# Patient Record
Sex: MALE | Race: WHITE | Employment: FULL TIME | URBAN - NONMETROPOLITAN AREA
[De-identification: names, ages, dates, MRNs, and addresses within clinical notes are randomized per-mention and may not be internally consistent; named-entity substitution may affect disease eponyms.]

---

## 2019-09-17 ENCOUNTER — OFFICE VISIT (OUTPATIENT)
Dept: FAMILY MEDICINE | Facility: CLINIC | Age: 39
End: 2019-09-17
Payer: COMMERCIAL

## 2019-09-17 VITALS
SYSTOLIC BLOOD PRESSURE: 138 MMHG | WEIGHT: 195 LBS | BODY MASS INDEX: 31.34 KG/M2 | OXYGEN SATURATION: 100 % | TEMPERATURE: 98.5 F | HEART RATE: 99 BPM | HEIGHT: 66 IN | DIASTOLIC BLOOD PRESSURE: 90 MMHG | RESPIRATION RATE: 12 BRPM

## 2019-09-17 DIAGNOSIS — J02.9 SORE THROAT: ICD-10-CM

## 2019-09-17 DIAGNOSIS — J03.90 ACUTE TONSILLITIS, UNSPECIFIED ETIOLOGY: Primary | ICD-10-CM

## 2019-09-17 LAB
DEPRECATED S PYO AG THROAT QL EIA: NORMAL
HETEROPH AB SER QL: NEGATIVE
SPECIMEN SOURCE: NORMAL

## 2019-09-17 PROCEDURE — 87081 CULTURE SCREEN ONLY: CPT | Performed by: FAMILY MEDICINE

## 2019-09-17 PROCEDURE — 36415 COLL VENOUS BLD VENIPUNCTURE: CPT | Performed by: FAMILY MEDICINE

## 2019-09-17 PROCEDURE — 99203 OFFICE O/P NEW LOW 30 MIN: CPT | Performed by: FAMILY MEDICINE

## 2019-09-17 PROCEDURE — 87880 STREP A ASSAY W/OPTIC: CPT | Performed by: FAMILY MEDICINE

## 2019-09-17 PROCEDURE — 86308 HETEROPHILE ANTIBODY SCREEN: CPT | Performed by: FAMILY MEDICINE

## 2019-09-17 RX ORDER — AZITHROMYCIN 250 MG/1
TABLET, FILM COATED ORAL
Qty: 6 TABLET | Refills: 0 | Status: SHIPPED | OUTPATIENT
Start: 2019-09-17 | End: 2021-09-24

## 2019-09-17 SDOH — HEALTH STABILITY: MENTAL HEALTH: HOW OFTEN DO YOU HAVE A DRINK CONTAINING ALCOHOL?: NEVER

## 2019-09-17 ASSESSMENT — MIFFLIN-ST. JEOR: SCORE: 1752.26

## 2019-09-17 NOTE — PROGRESS NOTES
aziSUBJECTIVE   Ac Mercado is a 39 year old male who presents with     Acute Illness   Acute illness concerns: headache, sore throat ear pain  Onset: 2 weeks    Fever: no     Chills/Sweats: no     Headache (location?): YES    Sinus Pressure:no    Conjunctivitis:  no    Ear Pain: YES- right     Rhinorrhea: no     Congestion: no     Sore Throat: YES     Cough: no    Wheeze: no     Decreased Appetite: no     Nausea: no     Vomiting: no     Diarrhea:  no     Dysuria/Freq.: no     Fatigue/Achiness: YES    Sick/Strep Exposure: YES- son was sick a week aggo     Therapies Tried and outcome: theraflu, cough medication isnt helping         PCP   Chet Carson [phone]    Health Maintenance        Health Maintenance Due   Topic Date Due     PREVENTIVE CARE VISIT  1982     HIV SCREENING  1997     DTAP/TDAP/TD IMMUNIZATION (1 - Tdap) 2007     LIPID  2017     PHQ-2  2019     INFLUENZA VACCINE (1) 2019       HPI      There is no problem list on file for this patient.    Current Outpatient Medications   Medication     CEPACOL 10-1.42 MG MT LOZG     TYLENOL 8 HOUR 650 MG OR TBCR     No current facility-administered medications for this visit.        There is no problem list on file for this patient.    History reviewed. No pertinent surgical history.    Social History     Tobacco Use     Smoking status: Former Smoker     Packs/day: 0.50     Years: 1.50     Pack years: 0.75     Last attempt to quit: 2018     Years since quittin.0     Smokeless tobacco: Never Used   Substance Use Topics     Alcohol use: Never     Frequency: Never     History reviewed. No pertinent family history.      Current Outpatient Medications   Medication Sig Dispense Refill     CEPACOL 10-1.42 MG MT LOZG None Entered       TYLENOL 8 HOUR 650 MG OR TBCR 2 TABLETS EVERY 8 HOURS AS NEEDED       Allergies   Allergen Reactions     Morphine Nausea and Vomiting     No lab results found.   BP Readings  "from Last 3 Encounters:   09/17/19 (!) 138/90    Wt Readings from Last 3 Encounters:   09/17/19 88.5 kg (195 lb)   06/28/05 73.9 kg (163 lb)                    Reviewed and updated:  Tobacco  Allergies  Meds  Med Hx  Surg Hx  Fam Hx  Soc Hx     ROS:  Constitutional, HEENT, cardiovascular, pulmonary, gi and gu systems are negative, except as otherwise noted.    PHYSICAL EXAM   BP (!) 138/90   Pulse 99   Temp 98.5  F (36.9  C) (Tympanic)   Resp 12   Ht 1.676 m (5' 6\")   Wt 88.5 kg (195 lb)   SpO2 100%   BMI 31.47 kg/m    Body mass index is 31.47 kg/m .  GENERAL: alert and no distress  EYES: right eye squint, no conjunctival injection or discharge noted  HENT: normal cephalic/atraumatic, ear canals and TM's normal, oral mucous membranes moist, oropharxnx crowded and sinuses: not tender  NECK: no adenopathy, no asymmetry, masses, or scars and thyroid normal to palpation  RESP: lungs clear to auscultation - no rales, rhonchi or wheezes  CV: regular rates and rhythm, normal S1 S2, no S3 or S4 and no murmur, click or rub  ABDOMEN: soft, nontender  SKIN: no suspicious lesions or rashes    Results for orders placed or performed in visit on 09/17/19   Mononucleosis screen   Result Value Ref Range    Mononucleosis Screen Negative NEG^Negative   Strep, Rapid Screen   Result Value Ref Range    Specimen Description Throat     Rapid Strep A Screen       NEGATIVE: No Group A streptococcal antigen detected by immunoassay, await culture report.         Assessment & Plan     (J03.90) Acute tonsillitis, unspecified etiology  (primary encounter diagnosis)  Comment: Suspect symptoms secondary to acute tonsillitis.  Rapid strep and mono test came back negative.  Treatment options discussed including waitful watching.  Patient would like to try antibiotic considering severity and chronicity of symptoms.  Azithromycin prescribed, common side effects discussed.  Suggested continue well hydration, warm fluids and over-the-counter " analgesia.  Recommended to follow-up if symptoms persist or worsen.  Will consider ENT consult.  Patient understood and in agreement with above plan.  All questions answered.  Plan: azithromycin (ZITHROMAX) 250 MG tablet            (J02.9) Sore throat  Comment:   Plan: Strep, Rapid Screen, Mononucleosis screen, Beta        strep group A culture               Patient Instructions       Patient Education     Self-Care for Sore Throats    Sore throats happen for many reasons, such as colds, allergies, and infections caused by viruses or bacteria. In any case, your throat becomes red and sore. Your goal for self-care is to reduce your discomfort while giving your throat a chance to heal.  Moisten and soothe your throat  Tips include the following:    Try a sip of water first thing after waking up.    Keep your throat moist by drinking 6 or more glasses of clear liquids every day.    Run a cool-air humidifier in your room overnight.    Avoid cigarette smoke.     Suck on throat lozenges, cough drops, hard candy, ice chips, or frozen fruit-juice bars. Use the sugar-free versions if your diet or medical condition requires them.  Gargle to ease irritation  Gargling every hour or 2 can ease irritation. Try gargling with 1 of these solutions:    1/4 teaspoon of salt in 1/2 cup of warm water    An over-the-counter anesthetic gargle  Use medicine for more relief  Over-the-counter medicine can reduce sore throat symptoms. Ask your pharmacist if you have questions about which medicine to use:    Ease pain with anesthetic sprays. Aspirin or an aspirin substitute also helps. Remember, never give aspirin to anyone 18 or younger, or if you are already taking blood thinners.     For sore throats caused by allergies, try antihistamines to block the allergic reaction.    Remember: unless a sore throat is caused by a bacterial infection, antibiotics won t help you.  Prevent future sore throats  Prevention tips include the  following:    Stop smoking or reduce contact with secondhand smoke. Smoke irritates the tender throat lining.    Limit contact with pets and with allergy-causing substances, such as pollen and mold.    When you re around someone with a sore throat or cold, wash your hands often to keep viruses or bacteria from spreading.    Don t strain your vocal cords.  Call your healthcare provider  Contact your healthcare provider if you have:    A temperature over 101 F (38.3 C)    White spots on the throat    Great difficulty swallowing    Trouble breathing    A skin rash    Recent exposure to someone else with strep bacteria    Severe hoarseness and swollen glands in the neck or jaw   Date Last Reviewed: 8/1/2016 2000-2018 [employer]. [address] [place] [address], [place] [address]. All rights reserved. This information is not intended as a substitute for professional medical care. Always follow your healthcare professional's instructions.                 Anthony Bedoya MD  [facility]

## 2019-09-17 NOTE — NURSING NOTE
"Chief Complaint   Patient presents with     Pharyngitis     few weeks      Otalgia     right ear        Initial BP (!) 138/90   Pulse 99   Resp 12   Ht 1.676 m (5' 6\")   Wt 88.5 kg (195 lb)   SpO2 100%   BMI 31.47 kg/m   Estimated body mass index is 31.47 kg/m  as calculated from the following:    Height as of this encounter: 1.676 m (5' 6\").    Weight as of this encounter: 88.5 kg (195 lb).    Patient presents to the clinic using No DME    Health Maintenance that is potentially due pending provider review:  NONE    n/a    Is there anyone who you would like to be able to receive your results? No  If yes have patient fill out MANPREET    "

## 2019-09-17 NOTE — PATIENT INSTRUCTIONS
Patient Education     Self-Care for Sore Throats    Sore throats happen for many reasons, such as colds, allergies, and infections caused by viruses or bacteria. In any case, your throat becomes red and sore. Your goal for self-care is to reduce your discomfort while giving your throat a chance to heal.  Moisten and soothe your throat  Tips include the following:    Try a sip of water first thing after waking up.    Keep your throat moist by drinking 6 or more glasses of clear liquids every day.    Run a cool-air humidifier in your room overnight.    Avoid cigarette smoke.     Suck on throat lozenges, cough drops, hard candy, ice chips, or frozen fruit-juice bars. Use the sugar-free versions if your diet or medical condition requires them.  Gargle to ease irritation  Gargling every hour or 2 can ease irritation. Try gargling with 1 of these solutions:    1/4 teaspoon of salt in 1/2 cup of warm water    An over-the-counter anesthetic gargle  Use medicine for more relief  Over-the-counter medicine can reduce sore throat symptoms. Ask your pharmacist if you have questions about which medicine to use:    Ease pain with anesthetic sprays. Aspirin or an aspirin substitute also helps. Remember, never give aspirin to anyone 18 or younger, or if you are already taking blood thinners.     For sore throats caused by allergies, try antihistamines to block the allergic reaction.    Remember: unless a sore throat is caused by a bacterial infection, antibiotics won t help you.  Prevent future sore throats  Prevention tips include the following:    Stop smoking or reduce contact with secondhand smoke. Smoke irritates the tender throat lining.    Limit contact with pets and with allergy-causing substances, such as pollen and mold.    When you re around someone with a sore throat or cold, wash your hands often to keep viruses or bacteria from spreading.    Don t strain your vocal cords.  Call your healthcare provider  Contact your  healthcare provider if you have:    A temperature over 101 F (38.3 C)    White spots on the throat    Great difficulty swallowing    Trouble breathing    A skin rash    Recent exposure to someone else with strep bacteria    Severe hoarseness and swollen glands in the neck or jaw   Date Last Reviewed: 8/1/2016 2000-2018 [employer] [address] [place], [place] [id number]. All rights reserved. This information is not intended as a substitute for professional medical care. Always follow your healthcare professional's instructions.

## 2019-09-17 NOTE — LETTER
September 19, 2019      Ac Mercado  [address] [place] [id number]        Dear Ac,         This letter is to inform you that the results of your recent throat culture are negative.  If you have any questions please call or make an appointment.          Sincerely,        Anthony Bedoya MD/ [facility]

## 2019-09-18 LAB
BACTERIA SPEC CULT: NORMAL
SPECIMEN SOURCE: NORMAL

## 2021-08-17 ENCOUNTER — OFFICE VISIT (OUTPATIENT)
Dept: FAMILY MEDICINE | Facility: CLINIC | Age: 41
End: 2021-08-17
Payer: COMMERCIAL

## 2021-08-17 ENCOUNTER — MYC MEDICAL ADVICE (OUTPATIENT)
Dept: FAMILY MEDICINE | Facility: CLINIC | Age: 41
End: 2021-08-17

## 2021-08-17 VITALS
WEIGHT: 202 LBS | BODY MASS INDEX: 32.47 KG/M2 | DIASTOLIC BLOOD PRESSURE: 94 MMHG | TEMPERATURE: 97.9 F | HEART RATE: 102 BPM | RESPIRATION RATE: 14 BRPM | HEIGHT: 66 IN | SYSTOLIC BLOOD PRESSURE: 140 MMHG | OXYGEN SATURATION: 97 %

## 2021-08-17 DIAGNOSIS — R50.9 FEVER, UNSPECIFIED FEVER CAUSE: Primary | ICD-10-CM

## 2021-08-17 DIAGNOSIS — J02.9 EXUDATIVE PHARYNGITIS: ICD-10-CM

## 2021-08-17 DIAGNOSIS — I10 ESSENTIAL HYPERTENSION, BENIGN: ICD-10-CM

## 2021-08-17 DIAGNOSIS — R07.0 THROAT PAIN: Primary | ICD-10-CM

## 2021-08-17 LAB
DEPRECATED S PYO AG THROAT QL EIA: NEGATIVE
GROUP A STREP BY PCR: NOT DETECTED

## 2021-08-17 PROCEDURE — 99203 OFFICE O/P NEW LOW 30 MIN: CPT | Performed by: NURSE PRACTITIONER

## 2021-08-17 PROCEDURE — 87651 STREP A DNA AMP PROBE: CPT | Performed by: NURSE PRACTITIONER

## 2021-08-17 RX ORDER — HYDROCHLOROTHIAZIDE 25 MG/1
25 TABLET ORAL DAILY
Qty: 30 TABLET | Refills: 0 | Status: SHIPPED | OUTPATIENT
Start: 2021-08-17 | End: 2024-04-10

## 2021-08-17 RX ORDER — AMOXICILLIN 500 MG/1
500 CAPSULE ORAL 2 TIMES DAILY
Qty: 20 CAPSULE | Refills: 0 | Status: SHIPPED | OUTPATIENT
Start: 2021-08-17 | End: 2021-10-01

## 2021-08-17 ASSESSMENT — PAIN SCALES - GENERAL: PAINLEVEL: SEVERE PAIN (6)

## 2021-08-17 ASSESSMENT — MIFFLIN-ST. JEOR: SCORE: 1779.02

## 2021-08-17 NOTE — PROGRESS NOTES
"    Assessment & Plan     Throat pain  Rapid strep negative.  Pending PCR.  - Streptococcus A Rapid Screen w/Reflex to PCR - Clinic Collect  - Group A Streptococcus PCR Throat Swab    Exudative pharyngitis  On exam exudative pharyngitis present.  Amoxicillin prescribed today as patient has a 3 week old infant at home. If symptoms do not improve follow up req.   - amoxicillin (AMOXIL) 500 MG capsule; Take 1 capsule (500 mg) by mouth 2 times daily    Essential hypertension, benign  Blood pressure has been elevated.  Recommend starting blood pressure reduction.  Reviewed lifestyle modifications and discussed weight loss.  Will start hydrochlorothiazide today to see if this will reduce blood pressure.  Advised to follow-up in the next 2 to 4 weeks.  He is in agreement this plan.  Will check labs at follow-up visit.  - hydrochlorothiazide (HYDRODIURIL) 25 MG tablet; Take 1 tablet (25 mg) by mouth daily             BMI:   Estimated body mass index is 32.6 kg/m  as calculated from the following:    Height as of this encounter: 1.676 m (5' 6\").    Weight as of this encounter: 91.6 kg (202 lb).           Return in about 4 weeks (around 9/14/2021) for blood pressure recheck.    VAN Yoder CNP  [facility]    Stacia Shen is a 40 year old who presents for the following health issues     HPI     Acute Illness  Acute illness concerns: throat pain  Onset/Duration: 3 days  Symptoms:  Fever: no  Chills/Sweats: no  Headache (location?): no  Sinus Pressure: no  Conjunctivitis:  no  Ear Pain: no  Rhinorrhea: no  Congestion: no  Sore Throat: YES  Cough: no  Wheeze: no  Decreased Appetite: no  Nausea: no  Vomiting: no  Diarrhea: no  Dysuria/Freq.: no  Dysuria or Hematuria: no  Fatigue/Achiness: YES- litle  Sick/Strep Exposure: no, kids have been healthy.   Therapies tried and outcome: tylenol, ibuprofen helps with pain some    He has a 3-week-old at home.  He has 5 children total all have " "been healthy without any significant illnesses.  He has noticed pus on the right side of his tonsil.    His blood pressure is elevated today in clinic on 2 checks.  Reviewing patient's history he has also had elevated blood pressures on previous checks.  It has been recommended that he help reduce his blood pressure with lifestyle changes which he has not been able to do.  He does drink a large amount of Mountain Dew and does not get significant exercise.  These are two things he is wanting to change.    Review of Systems   Constitutional, HEENT, cardiovascular, pulmonary, gi and gu systems are negative, except as otherwise noted.      Objective    BP (!) 140/94   Pulse 102   Temp 97.9  F (36.6  C) (Tympanic)   Resp 14   Ht 1.676 m (5' 6\")   Wt 91.6 kg (202 lb)   SpO2 97%   BMI 32.60 kg/m    Body mass index is 32.6 kg/m .     Physical Exam   GENERAL: healthy, alert and no distress  EYES: Eyes grossly normal to inspection, PERRL and conjunctivae and sclerae normal  HENT: normal cephalic/atraumatic, ear canals and TM's normal, oral mucous membranes moist. Positive for tonsillar hypertrophy, tonsillar erythema and tonsillar exudate.   NECK: slight cervical chain adenopathy present.  No asymmetry, masses, or scars and thyroid normal to palpation  RESP: lungs clear to auscultation - no rales, rhonchi or wheezes  CV: regular rate and rhythm, normal S1 S2, no S3 or S4, no murmur, click or rub, no peripheral edema and peripheral pulses strong  ABDOMEN: soft, nontender, no hepatosplenomegaly, no masses and bowel sounds normal  MS: no gross musculoskeletal defects noted, no edema    Results for orders placed or performed in visit on 08/17/21 (from the past 24 hour(s))   Streptococcus A Rapid Screen w/Reflex to PCR - Clinic Collect    Specimen: Throat; Swab   Result Value Ref Range    Group A Strep antigen Negative Negative       "

## 2021-08-18 DIAGNOSIS — R50.9 FEVER, UNSPECIFIED FEVER CAUSE: ICD-10-CM

## 2021-08-18 PROCEDURE — U0003 INFECTIOUS AGENT DETECTION BY NUCLEIC ACID (DNA OR RNA); SEVERE ACUTE RESPIRATORY SYNDROME CORONAVIRUS 2 (SARS-COV-2) (CORONAVIRUS DISEASE [COVID-19]), AMPLIFIED PROBE TECHNIQUE, MAKING USE OF HIGH THROUGHPUT TECHNOLOGIES AS DESCRIBED BY CMS-2020-01-R: HCPCS

## 2021-08-18 PROCEDURE — U0005 INFEC AGEN DETEC AMPLI PROBE: HCPCS

## 2021-08-19 LAB — SARS-COV-2 RNA RESP QL NAA+PROBE: NEGATIVE

## 2021-09-12 ENCOUNTER — HEALTH MAINTENANCE LETTER (OUTPATIENT)
Age: 41
End: 2021-09-12

## 2021-09-24 ENCOUNTER — E-VISIT (OUTPATIENT)
Dept: FAMILY MEDICINE | Facility: CLINIC | Age: 41
End: 2021-09-24
Payer: COMMERCIAL

## 2021-09-24 ENCOUNTER — MYC MEDICAL ADVICE (OUTPATIENT)
Dept: FAMILY MEDICINE | Facility: CLINIC | Age: 41
End: 2021-09-24

## 2021-09-24 DIAGNOSIS — Z20.822 SUSPECTED COVID-19 VIRUS INFECTION: Primary | ICD-10-CM

## 2021-09-24 PROCEDURE — 99421 OL DIG E/M SVC 5-10 MIN: CPT | Performed by: NURSE PRACTITIONER

## 2021-09-24 NOTE — PATIENT INSTRUCTIONS
Dear Ac Mercado,    Your symptoms show that you may have coronavirus (COVID-19). This illness can cause fever, cough and trouble breathing. Many people get a mild case and get better on their own. Some people can get very sick.    Will I be tested for COVID-19?  We would like to test you for Covid-19 virus. I have placed orders for this test.     To schedule: go to your [employer] home page and scroll down to the section that says  You have an appointment that needs to be scheduled  and click the large green button that says  Schedule Now  and follow the steps to find the next available openings.    If you are unable to complete these [employer] scheduling steps, please call [phone] to schedule your testing.     Return to work/school/ guidance:  Please let your workplace manager and staffing office know when your quarantine ends     We can t give you an exact date as it depends on the above. You can calculate this on your own or work with your manager/staffing office to calculate this. (For example if you were exposed on 10/4, you would have to quarantine for 14 full days. That would be through 10/18. You could return on 10/19.)      If you receive a positive COVID-19 test result, follow the guidance of the those who are giving you the results. Usually the return to work is 10 (or in some cases 20 days from symptom onset.) If you work at [facility], you must also be cleared by Employee Occupational Health and Safety to return to work.        If you receive a negative COVID-19 test result and did not have a high risk exposure to someone with a known positive COVID-19 test, you can return to work once you're free of fever for 24 hours without fever-reducing medication and your symptoms are improving or resolved.      If you receive a negative COVID-19 test and If you had a high risk exposure to someone who has tested positive for COVID-19 then you can return to work 14 days after your last contact  with the positive individual    Note: If you have ongoing exposure to the covid positive person, this quarantine period may be more than 14 days. (For example, if you are continued to be exposed to your child who tested positive and cannot isolate from them, then the quarantine of 7-14 days can't start until your child is no longer contagious. This is typically 10 days from onset of the child's symptoms. So the total duration may be 17-24 days in this case.)    Sign up for [employer].   We know it's scary to hear that you might have COVID-19. We want to track your symptoms to make sure you're okay over the next 2 weeks. Please look for an email from [employer]--this is a free, online program that we'll use to keep in touch. To sign up, follow the link in the email you will receive. Learn more at [email or web address]    How can I take care of myself?    Get lots of rest. Drink extra fluids (unless a doctor has told you not to)    Take Tylenol (acetaminophen) or ibuprofen for fever or pain. If you have liver or kidney problems, ask your family doctor if it's okay to take Tylenol o ibuprofen    If you have other health problems (like cancer, heart failure, an organ transplant or severe kidney disease): Call your specialty clinic if you don't feel better in the next 2 days.    Know when to call 911. Emergency warning signs include:  o Trouble breathing or shortness of breath  o Pain or pressure in the chest that doesn't go away  o Feeling confused like you haven't felt before, or not being able to wake up  o Bluish-colored lips or face    Where can I get more information?  [facility] - About COVID-19:   [email or web address]    CDC - What to Do If You're Sick:   www.cdc.gov/coronavirus/2019-ncov/about/steps-when-sick.html

## 2021-10-01 PROBLEM — E78.2 MIXED HYPERLIPIDEMIA: Status: ACTIVE | Noted: 2018-09-26

## 2021-10-01 PROBLEM — R91.8 PULMONARY NODULES: Status: ACTIVE | Noted: 2018-08-10

## 2022-11-19 ENCOUNTER — HEALTH MAINTENANCE LETTER (OUTPATIENT)
Age: 42
End: 2022-11-19

## 2024-01-28 ENCOUNTER — HEALTH MAINTENANCE LETTER (OUTPATIENT)
Age: 44
End: 2024-01-28

## 2024-04-09 ASSESSMENT — ASTHMA QUESTIONNAIRES
QUESTION_2 LAST FOUR WEEKS HOW OFTEN HAVE YOU HAD SHORTNESS OF BREATH: ONCE OR TWICE A WEEK
QUESTION_4 LAST FOUR WEEKS HOW OFTEN HAVE YOU USED YOUR RESCUE INHALER OR NEBULIZER MEDICATION (SUCH AS ALBUTEROL): ONCE A WEEK OR LESS
QUESTION_1 LAST FOUR WEEKS HOW MUCH OF THE TIME DID YOUR ASTHMA KEEP YOU FROM GETTING AS MUCH DONE AT WORK, SCHOOL OR AT HOME: NONE OF THE TIME
QUESTION_3 LAST FOUR WEEKS HOW OFTEN DID YOUR ASTHMA SYMPTOMS (WHEEZING, COUGHING, SHORTNESS OF BREATH, CHEST TIGHTNESS OR PAIN) WAKE YOU UP AT NIGHT OR EARLIER THAN USUAL IN THE MORNING: NOT AT ALL
ACT_TOTALSCORE: 22
QUESTION_5 LAST FOUR WEEKS HOW WOULD YOU RATE YOUR ASTHMA CONTROL: WELL CONTROLLED
ACT_TOTALSCORE: 22

## 2024-04-10 ENCOUNTER — ANCILLARY PROCEDURE (OUTPATIENT)
Dept: GENERAL RADIOLOGY | Facility: CLINIC | Age: 44
End: 2024-04-10
Attending: PHYSICIAN ASSISTANT
Payer: COMMERCIAL

## 2024-04-10 ENCOUNTER — MYC REFILL (OUTPATIENT)
Dept: FAMILY MEDICINE | Facility: CLINIC | Age: 44
End: 2024-04-10

## 2024-04-10 ENCOUNTER — OFFICE VISIT (OUTPATIENT)
Dept: FAMILY MEDICINE | Facility: CLINIC | Age: 44
End: 2024-04-10
Payer: COMMERCIAL

## 2024-04-10 DIAGNOSIS — Z13.1 SCREENING FOR DIABETES MELLITUS: ICD-10-CM

## 2024-04-10 DIAGNOSIS — Z11.59 NEED FOR HEPATITIS C SCREENING TEST: ICD-10-CM

## 2024-04-10 DIAGNOSIS — R20.0 NUMBNESS AND TINGLING OF BOTH UPPER EXTREMITIES: Primary | ICD-10-CM

## 2024-04-10 DIAGNOSIS — R20.0 NUMBNESS AND TINGLING OF BOTH UPPER EXTREMITIES: ICD-10-CM

## 2024-04-10 DIAGNOSIS — E78.2 MIXED HYPERLIPIDEMIA: ICD-10-CM

## 2024-04-10 DIAGNOSIS — R20.2 NUMBNESS AND TINGLING OF BOTH UPPER EXTREMITIES: Primary | ICD-10-CM

## 2024-04-10 DIAGNOSIS — R91.8 PULMONARY NODULES: ICD-10-CM

## 2024-04-10 DIAGNOSIS — Z11.4 SCREENING FOR HIV (HUMAN IMMUNODEFICIENCY VIRUS): ICD-10-CM

## 2024-04-10 DIAGNOSIS — R20.2 NUMBNESS AND TINGLING OF BOTH UPPER EXTREMITIES: ICD-10-CM

## 2024-04-10 LAB
CHOLEST SERPL-MCNC: 224 MG/DL
CRP SERPL-MCNC: <3 MG/L
ERYTHROCYTE [DISTWIDTH] IN BLOOD BY AUTOMATED COUNT: 12.9 % (ref 10–15)
ERYTHROCYTE [SEDIMENTATION RATE] IN BLOOD BY WESTERGREN METHOD: 6 MM/HR (ref 0–15)
FASTING STATUS PATIENT QL REPORTED: YES
HBA1C MFR BLD: 5.6 % (ref 0–5.6)
HCT VFR BLD AUTO: 50.1 % (ref 40–53)
HDLC SERPL-MCNC: 37 MG/DL
HGB BLD-MCNC: 17 G/DL (ref 13.3–17.7)
LDLC SERPL CALC-MCNC: 123 MG/DL
MCH RBC QN AUTO: 27.6 PG (ref 26.5–33)
MCHC RBC AUTO-ENTMCNC: 33.9 G/DL (ref 31.5–36.5)
MCV RBC AUTO: 82 FL (ref 78–100)
NONHDLC SERPL-MCNC: 187 MG/DL
PLATELET # BLD AUTO: 225 10E3/UL (ref 150–450)
RBC # BLD AUTO: 6.15 10E6/UL (ref 4.4–5.9)
TRIGL SERPL-MCNC: 318 MG/DL
WBC # BLD AUTO: 7.1 10E3/UL (ref 4–11)

## 2024-04-10 PROCEDURE — 36415 COLL VENOUS BLD VENIPUNCTURE: CPT | Performed by: PHYSICIAN ASSISTANT

## 2024-04-10 PROCEDURE — 84425 ASSAY OF VITAMIN B-1: CPT | Mod: 90 | Performed by: PHYSICIAN ASSISTANT

## 2024-04-10 PROCEDURE — 87389 HIV-1 AG W/HIV-1&-2 AB AG IA: CPT | Performed by: PHYSICIAN ASSISTANT

## 2024-04-10 PROCEDURE — 86140 C-REACTIVE PROTEIN: CPT | Performed by: PHYSICIAN ASSISTANT

## 2024-04-10 PROCEDURE — 83036 HEMOGLOBIN GLYCOSYLATED A1C: CPT | Performed by: PHYSICIAN ASSISTANT

## 2024-04-10 PROCEDURE — 99214 OFFICE O/P EST MOD 30 MIN: CPT | Performed by: PHYSICIAN ASSISTANT

## 2024-04-10 PROCEDURE — 85652 RBC SED RATE AUTOMATED: CPT | Performed by: PHYSICIAN ASSISTANT

## 2024-04-10 PROCEDURE — 86803 HEPATITIS C AB TEST: CPT | Performed by: PHYSICIAN ASSISTANT

## 2024-04-10 PROCEDURE — 80061 LIPID PANEL: CPT | Performed by: PHYSICIAN ASSISTANT

## 2024-04-10 PROCEDURE — 72040 X-RAY EXAM NECK SPINE 2-3 VW: CPT | Mod: TC | Performed by: RADIOLOGY

## 2024-04-10 PROCEDURE — 82607 VITAMIN B-12: CPT | Performed by: PHYSICIAN ASSISTANT

## 2024-04-10 PROCEDURE — 99000 SPECIMEN HANDLING OFFICE-LAB: CPT | Performed by: PHYSICIAN ASSISTANT

## 2024-04-10 PROCEDURE — 85027 COMPLETE CBC AUTOMATED: CPT | Performed by: PHYSICIAN ASSISTANT

## 2024-04-10 ASSESSMENT — ENCOUNTER SYMPTOMS: BACK PAIN: 1

## 2024-04-10 ASSESSMENT — PAIN SCALES - GENERAL: PAINLEVEL: NO PAIN (0)

## 2024-04-10 NOTE — PROGRESS NOTES
Assessment & Plan   Numbness and tingling of both upper extremities  Symptoms have been present for 1 week.  No trigger just woke up with symptoms.  There is no specific distribution to the numbness of both upper extremities.  Does have tingling in the fingertips.  He has no other significant pain.  Cervical spine x-ray was done in clinic today and did not demonstrate any significant pathology.  He does mention some hip discomfort upon waking so we will check some basic labs to assess for polymyalgia rheumatica, although this would be unlikely given his younger age.  We also check vitamin B 12 and B1 levels.  Reassurance was provided but if symptoms do not improve in the next 4 to 6 weeks I recommend a cervical spine MRI to evaluate for cervical canal stenosis.  - XR Cervical Spine 2/3 Views; Future  - ESR: Erythrocyte sedimentation rate; Future  - CRP, inflammation; Future  - CBC with platelets; Future  - Vitamin B12; Future  - Vitamin B1 whole blood; Future    Pulmonary nodules  Due for recheck.  Previous CT scans were reviewed.   - CT Chest w/o Contrast; Future    Mixed hyperlipidemia  Due for recheck  - Lipid panel reflex to direct LDL Non-fasting; Future    Screening for HIV (human immunodeficiency virus)  Due for recheck  - HIV Antigen Antibody Combo; Future    Need for hepatitis C screening test  Due for recheck  - Hepatitis C Screen Reflex to HCV RNA Quant and Genotype; Future    Screening for diabetes mellitus  Due for recheck  - Hemoglobin A1c; Future    Subjective   Ac is a 43 year old, presenting for the following health issues:  Back Pain and Hypertension        4/10/2024     9:46 AM   Additional Questions   Roomed by uri   Accompanied by self         4/10/2024     9:46 AM   Patient Reported Additional Medications   Patient reports taking the following new medications none     History of Present Illness       Back Pain:  He presents for follow up of back pain. Patient's back pain is a new  problem.    Original cause of back pain: other  First noticed back pain: 1-4 weeks ago  Patient feels back pain: comes and goesLocation of back pain:  Right lower back, left lower back, right shoulder, left shoulder, right hip and left hip  Description of back pain: dull ache  Back pain spreads: right thigh, left thigh and right side of neck    Since patient first noticed back pain, pain is: always present, but gets better and worse  Does back pain interfere with his job:  No  On a scale of 1-10 (10 being the worst), patient describes pain as:  4  What makes back pain worse: bending and sitting   Acupuncture: not tried  Acetaminophen: not helpful  Activity or exercise: not helpful  Chiropractor:  Not tried  Cold: not tried  Heat: helpful  Massage: not tried  Muscle relaxants: not tried  NSAIDS: not helpful  Opioids: not tried  Physical Therapy: not tried  Rest: not helpful  Steroid Injection: not tried  Stretching: not helpful  Surgery: not tried  TENS unit: not tried  Topical pain relievers: helpful  Other healthcare providers patient is seeing for back pain: None    Hypertension: He presents for follow up of hypertension.  He does check blood pressure  regularly outside of the clinic. Outside blood pressures have been over 140/90. He does not follow a low salt diet.     He eats 2-3 servings of fruits and vegetables daily.He consumes 0 sweetened beverage(s) daily.He exercises with enough effort to increase his heart rate 10 to 19 minutes per day.  He exercises with enough effort to increase his heart rate 3 or less days per week.   He is taking medications regularly.     Whole arms feel numb - Constant over the last few days. Nothing makes is better or worse. Maybe worse with walking.   Low back is sore  Leans forward and has chest tightness.   Started 1 week ago. No injury or trauma.   Woke up with back pain.   Developed over days     Review of Systems  See HPI         Objective    BP (!) 140/98   Pulse 98    Temp 98.3  F (36.8  C) (Tympanic)   Resp 16   Wt 86.5 kg (190 lb 9.6 oz)   SpO2 98%   BMI 30.76 kg/m    Body mass index is 30.76 kg/m .  Physical Exam   GENERAL: alert and no distress  NECK: no adenopathy, no asymmetry, masses, or scars  RESP: lungs clear to auscultation - no rales, rhonchi or wheezes  CV: regular rate and rhythm, normal S1 S2, no S3 or S4, no murmur, click or rub, no peripheral edema  MS: no gross musculoskeletal defects noted, no edema  NEURO: Normal strength and tone, sensory exam grossly normal, light touch normal, mentation intact, speech normal, cranial nerves 2-12 intact, gait normal, and rapid alternating movements normal. 5/5 strength in the bilateral upper and lower extremities including  strength, biceps and triceps and deltoid strength.       Signed Electronically by: Roshan Hernandez PA-C

## 2024-04-11 LAB
HCV AB SERPL QL IA: NONREACTIVE
HIV 1+2 AB+HIV1 P24 AG SERPL QL IA: NONREACTIVE
VIT B12 SERPL-MCNC: 478 PG/ML (ref 232–1245)

## 2024-04-12 VITALS
RESPIRATION RATE: 16 BRPM | TEMPERATURE: 98.3 F | DIASTOLIC BLOOD PRESSURE: 98 MMHG | HEART RATE: 98 BPM | OXYGEN SATURATION: 98 % | BODY MASS INDEX: 30.76 KG/M2 | WEIGHT: 190.6 LBS | SYSTOLIC BLOOD PRESSURE: 138 MMHG

## 2024-04-12 NOTE — PATIENT INSTRUCTIONS
Monitor symptoms closely. If worsening, please contact me.     Lab work is pending.     X-ray of your neck was unremarkable.     Follow-up if symptoms not improved in 4-6 weeks.

## 2024-04-15 LAB — VIT B1 PYROPHOSHATE BLD-SCNC: 136 NMOL/L

## 2024-04-24 ENCOUNTER — HOSPITAL ENCOUNTER (OUTPATIENT)
Dept: CT IMAGING | Facility: CLINIC | Age: 44
Discharge: HOME | End: 2024-04-24
Attending: PHYSICIAN ASSISTANT | Admitting: PHYSICIAN ASSISTANT
Payer: COMMERCIAL

## 2024-04-24 DIAGNOSIS — R91.8 PULMONARY NODULES: ICD-10-CM

## 2024-04-24 PROCEDURE — 71250 CT THORAX DX C-: CPT

## 2024-06-06 ENCOUNTER — MYC MEDICAL ADVICE (OUTPATIENT)
Dept: FAMILY MEDICINE | Facility: CLINIC | Age: 44
End: 2024-06-06
Payer: COMMERCIAL

## 2025-02-01 ENCOUNTER — HEALTH MAINTENANCE LETTER (OUTPATIENT)
Age: 45
End: 2025-02-01

## 2025-06-20 ENCOUNTER — ANCILLARY PROCEDURE (OUTPATIENT)
Dept: GENERAL RADIOLOGY | Facility: CLINIC | Age: 45
End: 2025-06-20
Attending: PHYSICIAN ASSISTANT
Payer: COMMERCIAL

## 2025-06-20 DIAGNOSIS — G89.29 CHRONIC RIGHT SHOULDER PAIN: ICD-10-CM

## 2025-06-20 DIAGNOSIS — M25.511 CHRONIC RIGHT SHOULDER PAIN: ICD-10-CM

## 2025-06-20 PROCEDURE — 73030 X-RAY EXAM OF SHOULDER: CPT | Mod: TC | Performed by: INTERNAL MEDICINE

## 2025-06-23 ENCOUNTER — RESULTS FOLLOW-UP (OUTPATIENT)
Dept: FAMILY MEDICINE | Facility: CLINIC | Age: 45
End: 2025-06-23

## 2025-06-23 DIAGNOSIS — M75.101 TEAR OF RIGHT SUPRASPINATUS TENDON: ICD-10-CM

## 2025-06-23 DIAGNOSIS — E78.2 MIXED HYPERLIPIDEMIA: Primary | ICD-10-CM

## 2025-06-23 RX ORDER — ROSUVASTATIN CALCIUM 10 MG/1
10 TABLET, COATED ORAL DAILY
Qty: 90 TABLET | Refills: 3 | Status: SHIPPED | OUTPATIENT
Start: 2025-06-23

## 2025-07-16 ENCOUNTER — HOSPITAL ENCOUNTER (OUTPATIENT)
Dept: MRI IMAGING | Facility: CLINIC | Age: 45
Discharge: HOME | End: 2025-07-16
Attending: PHYSICIAN ASSISTANT
Payer: COMMERCIAL

## 2025-07-16 ENCOUNTER — HOSPITAL ENCOUNTER (OUTPATIENT)
Dept: CT IMAGING | Facility: CLINIC | Age: 45
Discharge: HOME | End: 2025-07-16
Attending: PHYSICIAN ASSISTANT
Payer: COMMERCIAL

## 2025-07-16 DIAGNOSIS — G89.29 CHRONIC RIGHT SHOULDER PAIN: ICD-10-CM

## 2025-07-16 DIAGNOSIS — R91.8 PULMONARY NODULES: ICD-10-CM

## 2025-07-16 DIAGNOSIS — M25.511 CHRONIC RIGHT SHOULDER PAIN: ICD-10-CM

## 2025-07-16 PROCEDURE — 73221 MRI JOINT UPR EXTREM W/O DYE: CPT | Mod: RT

## 2025-07-16 PROCEDURE — 71250 CT THORAX DX C-: CPT

## 2025-07-21 SDOH — HEALTH STABILITY: PHYSICAL HEALTH: ON AVERAGE, HOW MANY MINUTES DO YOU ENGAGE IN EXERCISE AT THIS LEVEL?: 30 MIN

## 2025-07-21 SDOH — HEALTH STABILITY: PHYSICAL HEALTH: ON AVERAGE, HOW MANY DAYS PER WEEK DO YOU ENGAGE IN MODERATE TO STRENUOUS EXERCISE (LIKE A BRISK WALK)?: 3 DAYS

## 2025-07-22 ENCOUNTER — OFFICE VISIT (OUTPATIENT)
Dept: ORTHOPEDICS | Facility: CLINIC | Age: 45
End: 2025-07-22
Attending: NURSE PRACTITIONER
Payer: COMMERCIAL

## 2025-07-22 DIAGNOSIS — M25.511 ACUTE PAIN OF RIGHT SHOULDER: Primary | ICD-10-CM

## 2025-07-22 DIAGNOSIS — M89.9 SCAPULAR DYSFUNCTION: ICD-10-CM

## 2025-07-22 DIAGNOSIS — M67.911 TENDINOPATHY OF RIGHT ROTATOR CUFF: ICD-10-CM

## 2025-07-22 PROCEDURE — 99203 OFFICE O/P NEW LOW 30 MIN: CPT | Performed by: FAMILY MEDICINE

## 2025-07-22 SDOH — HEALTH STABILITY: PHYSICAL HEALTH: ON AVERAGE, HOW MANY MINUTES DO YOU ENGAGE IN EXERCISE AT THIS LEVEL?: 30 MIN

## 2025-07-22 SDOH — HEALTH STABILITY: PHYSICAL HEALTH: ON AVERAGE, HOW MANY DAYS PER WEEK DO YOU ENGAGE IN MODERATE TO STRENUOUS EXERCISE (LIKE A BRISK WALK)?: 3 DAYS

## 2025-07-22 NOTE — PROGRESS NOTES
Ac Mercado  :  1980  DOS: 2025  MRN: [id number]    Sports Medicine Clinic Visit    PCP: Roshan Hernandez    Ac Mercado is a 44 year old Right hand dominant male who is seen in consultation at the request of  Kennedi Metz C.N.P. presenting with chronic right shoulder pain.    Injury: Gradual onset of pain over the past 2+ years that initially started with bowling worse over the past 4 months after participating in multiple day [employer] tournament.  Pain located over right anterior lateral shoulder, nonradiating.  Additional Features:  Positive: weakness.  Symptoms are better with activity modification.  Symptoms are worse with: lying on right shoulder, lifting overhead.  Other evaluation and/or treatments so far consists of: activity avoidance.  Recent imaging completed: MRI completed 2025, XR completed 2025.  Prior History of related problems: none    Social History: currently employed as customer service\     Review of Systems  Musculoskeletal: as above  Remainder of review of systems is negative including constitutional, CV, pulmonary, GI, Skin and Neurologic except as noted in HPI or medical history.    No past medical history on file.  No past surgical history on file.  No family history on file.    Objective  There were no vitals taken for this visit.    General: healthy, alert and in no distress    HEENT: no scleral icterus or conjunctival erythema   Skin: no suspicious lesions or rash. No jaundice.   CV: regular rhythm by palpation, 2+ distal pulses, no pedal edema    Resp: normal respiratory effort without conversational dyspnea   Psych: normal mood and affect    Gait: nonantalgic, appropriate coordination and balance   Neuro: normal light touch sensory exam of the extremities. Motor strength as noted below     Right Shoulder exam    ROM:        Full active and passive ROM with flexion, extension, abduction, internal and external rotation.        asymmetric scapular motion due to pain and dysfunction       Painful terminal ER, abduction, flexion    Tender:        subacromial space       posterior shoulder    Non Tender:       remainder of shoulder       sternoclavicular joint       acromioclavicular joint    Strength:        abduction 5/5       internal rotation 5/5       external rotation 5/5       adduction 5/5    Impingement testing:        neg (-) Neer       positive (+) Miller       positive (+) empty can       neg (-) crossover       neg (-) O'burke       Mildly painful crank    Stability testing:       neg (-) anterior glide       neg (-) sulcus sign    Skin:       no visible deformities       well perfused       capillary refill brisk    Sensation:        normal sensation over shoulder and upper extremity       Radiology  Results for orders placed or performed during the hospital encounter of 07/16/25   MR Shoulder Right w/o Contrast    Narrative    EXAM: MR right shoulder without  contrast 7/17/2025 7:36 AM    TECHNIQUE: Multiplanar, multisequence imaging of the right shoulder  were obtained without administration of intravenous or intra-articular  gadolinium contrast using routine protocol.    History: Chronic right shoulder pain; Chronic right shoulder pain    Comparison: Shoulder radiographs dated 6/20/2025    Findings:    ROTATOR CUFF and ASSOCIATED STRUCTURES  Rotator cuff: Low-grade intrasubstance tear of the far anterior fibers  of the supraspinatus tendon and mild bursal sided fraying superimposed  on moderate grade tendinopathy. Intact infraspinatus tendon. Mild to  moderate tendinopathy of the subscapularis tendon and the teres minor  tendon is intact.    Bursa: No subacromial or subdeltoid bursal fluid.    Musculature: Muscle bulk of rotator cuff is preserved.  Deltoid muscle  bulk is also preserved.  No muscle edema.    Acromioclavicular joint  There are mild degenerative changes of the acromioclavicular joint.  Acromion is type 2 in  sagittal morphology.  Coracoacromial ligament is  not thickened.    OSSEOUS STRUCTURES  No fracture, marrow contusion or marrow infiltration.    LONG BICIPITAL TENDON  The long head of the biceps tendon is normally situated within the  bicipital groove. No complete or partial biceps tendon tear is  present.    GLENOHUMERAL JOINT  Joint fluid: Physiologic amount of joint fluid is  present.    Cartilage and subarticular bone:  No focal hyaline cartilage defects  are noted. No Hill-Sachs, reverse Hill-Sachs, or bony Bankart lesions  are seen.    Labrum: Limited assessment on this study with relative lack of joint  distention shows no labral tear.      Impression    Impression:  1. Low-grade intrasubstance tear of the far anterior fibers of the  supraspinatus tendon and mild bursal sided fraying superimposed on  moderate grade tendinopathy. No significant tendon retraction.   2. Mild to moderate tendinopathy of the subscapularis tendon.   3. Preserved muscle bulk.    JUTTA ELLERMANN, MD         SYSTEM ID:  [id number]     Narrative & Impression   EXAM: XR SHOULDER RIGHT G/E 3 VIEWS  LOCATION: [facility]  DATE: 6/20/2025     INDICATION:  Chronic right shoulder pain, Chronic right shoulder pain  COMPARISON: None.                                                                      IMPRESSION: No acute displaced fracture or subluxation. Minimal degenerative change of the acromioclavicular joint. Glenohumeral joint space is not profiled. Bone island of the proximal humerus.        Assessment:  1. Acute pain of right shoulder    2. Tendinopathy of right rotator cuff    3. Scapular dysfunction        Plan:  Discussed the assessment with the patient.  Follow up: 1-2 mo if not improving, sooner if needed  Acute flare of more chronic right shoulder pain, worsens with bowling  Reviewed scapular dysfunction and mild wear/tear changes in the RTC on MRI  MR images independently visualized and reviewed with  patient today in clinic  No weakness associated with pathology on MRI which is reassuring, reviewed indications for surgical referral  Combination of RTC >> GH joint sx, cannot completely r/o labral pathology  PT options reviewed, ordered today  Oral Tylenol and topical Voltaren gel reviewed as safe OTC options, reviewed safe dosing strategies  Use of short 1-2 wk course of NSAIDs reviewed, dosing and precautions reviewed if utilized  Could consider diagnostic/therapeutic CSI trial if not improving or worsening, but hopeful to avoid  We discussed modified progressive pain-free activity as tolerated  Home handouts provided and supportive care reviewed  All questions were answered today  Contact us with additional questions or concerns  Signs and sx of concern reviewed    Thanks very much for sending this nice gentleman to us, I will keep you updated with his progress      Duran Fong DO, CA  Sports Medicine Physician  [facility]        Disclaimer: This note consists of symbols derived from keyboarding, dictation and/or voice recognition software. As a result, there may be errors in the script that have gone undetected. Please consider this when interpreting information found in this chart.

## 2025-07-22 NOTE — LETTER
2025      Ac Mercado  [address] [place] [id number]      Dear Colleague,    Thank you for referring your patient, Ac Mercado, to the [facility] [place]. Please see a copy of my visit note below.    Ac Mercado  :  1980  DOS: 2025  MRN: [id number]    Sports Medicine Clinic Visit    PCP: Roshan Hernandez    Ac Mercado is a 44 year old Right hand dominant male who is seen in consultation at the request of  Kennedi Metz C.N.P. presenting with chronic right shoulder pain.    Injury: Gradual onset of pain over the past 2+ years that initially started with bowling worse over the past 4 months after participating in multiple day [employer] tournament.  Pain located over right anterior lateral shoulder, nonradiating.  Additional Features:  Positive: weakness.  Symptoms are better with activity modification.  Symptoms are worse with: lying on right shoulder, lifting overhead.  Other evaluation and/or treatments so far consists of: activity avoidance.  Recent imaging completed: MRI completed 2025, XR completed 2025.  Prior History of related problems: none    Social History: currently employed as customer service\     Review of Systems  Musculoskeletal: as above  Remainder of review of systems is negative including constitutional, CV, pulmonary, GI, Skin and Neurologic except as noted in HPI or medical history.    No past medical history on file.  No past surgical history on file.  No family history on file.    Objective  There were no vitals taken for this visit.    General: healthy, alert and in no distress    HEENT: no scleral icterus or conjunctival erythema   Skin: no suspicious lesions or rash. No jaundice.   CV: regular rhythm by palpation, 2+ distal pulses, no pedal edema    Resp: normal respiratory effort without conversational dyspnea   Psych: normal mood and affect    Gait: nonantalgic, appropriate  coordination and balance   Neuro: normal light touch sensory exam of the extremities. Motor strength as noted below     Right Shoulder exam    ROM:        Full active and passive ROM with flexion, extension, abduction, internal and external rotation.       asymmetric scapular motion due to pain and dysfunction       Painful terminal ER, abduction, flexion    Tender:        subacromial space       posterior shoulder    Non Tender:       remainder of shoulder       sternoclavicular joint       acromioclavicular joint    Strength:        abduction 5/5       internal rotation 5/5       external rotation 5/5       adduction 5/5    Impingement testing:        neg (-) Neer       positive (+) Miller       positive (+) empty can       neg (-) crossover       neg (-) O'burke       Mildly painful crank    Stability testing:       neg (-) anterior glide       neg (-) sulcus sign    Skin:       no visible deformities       well perfused       capillary refill brisk    Sensation:        normal sensation over shoulder and upper extremity       Radiology  Results for orders placed or performed during the hospital encounter of 07/16/25   MR Shoulder Right w/o Contrast    Narrative    EXAM: MR right shoulder without  contrast 7/17/2025 7:36 AM    TECHNIQUE: Multiplanar, multisequence imaging of the right shoulder  were obtained without administration of intravenous or intra-articular  gadolinium contrast using routine protocol.    History: Chronic right shoulder pain; Chronic right shoulder pain    Comparison: Shoulder radiographs dated 6/20/2025    Findings:    ROTATOR CUFF and ASSOCIATED STRUCTURES  Rotator cuff: Low-grade intrasubstance tear of the far anterior fibers  of the supraspinatus tendon and mild bursal sided fraying superimposed  on moderate grade tendinopathy. Intact infraspinatus tendon. Mild to  moderate tendinopathy of the subscapularis tendon and the teres minor  tendon is intact.    Bursa: No subacromial or  subdeltoid bursal fluid.    Musculature: Muscle bulk of rotator cuff is preserved.  Deltoid muscle  bulk is also preserved.  No muscle edema.    Acromioclavicular joint  There are mild degenerative changes of the acromioclavicular joint.  Acromion is type 2 in sagittal morphology.  Coracoacromial ligament is  not thickened.    OSSEOUS STRUCTURES  No fracture, marrow contusion or marrow infiltration.    LONG BICIPITAL TENDON  The long head of the biceps tendon is normally situated within the  bicipital groove. No complete or partial biceps tendon tear is  present.    GLENOHUMERAL JOINT  Joint fluid: Physiologic amount of joint fluid is  present.    Cartilage and subarticular bone:  No focal hyaline cartilage defects  are noted. No Hill-Sachs, reverse Hill-Sachs, or bony Bankart lesions  are seen.    Labrum: Limited assessment on this study with relative lack of joint  distention shows no labral tear.      Impression    Impression:  1. Low-grade intrasubstance tear of the far anterior fibers of the  supraspinatus tendon and mild bursal sided fraying superimposed on  moderate grade tendinopathy. No significant tendon retraction.   2. Mild to moderate tendinopathy of the subscapularis tendon.   3. Preserved muscle bulk.    JUTTA ELLERMANN, MD         SYSTEM ID:  [id number]     Narrative & Impression   EXAM: XR SHOULDER RIGHT G/E 3 VIEWS  LOCATION: [facility]  DATE: 6/20/2025     INDICATION:  Chronic right shoulder pain, Chronic right shoulder pain  COMPARISON: None.                                                                      IMPRESSION: No acute displaced fracture or subluxation. Minimal degenerative change of the acromioclavicular joint. Glenohumeral joint space is not profiled. Bone island of the proximal humerus.        Assessment:  1. Acute pain of right shoulder    2. Tendinopathy of right rotator cuff    3. Scapular dysfunction        Plan:  Discussed the assessment with the  patient.  Follow up: 1-2 mo if not improving, sooner if needed  Acute flare of more chronic right shoulder pain, worsens with bowling  Reviewed scapular dysfunction and mild wear/tear changes in the RTC on MRI  MR images independently visualized and reviewed with patient today in clinic  No weakness associated with pathology on MRI which is reassuring, reviewed indications for surgical referral  Combination of RTC >> GH joint sx, cannot completely r/o labral pathology  PT options reviewed, ordered today  Oral Tylenol and topical Voltaren gel reviewed as safe OTC options, reviewed safe dosing strategies  Use of short 1-2 wk course of NSAIDs reviewed, dosing and precautions reviewed if utilized  Could consider diagnostic/therapeutic CSI trial if not improving or worsening, but hopeful to avoid  We discussed modified progressive pain-free activity as tolerated  Home handouts provided and supportive care reviewed  All questions were answered today  Contact us with additional questions or concerns  Signs and sx of concern reviewed    Thanks very much for sending this nice gentleman to us, I will keep you updated with his progress      Duran Fong DO, [facility]  Sports Medicine Physician  [facility]        Disclaimer: This note consists of symbols derived from keyboarding, dictation and/or voice recognition software. As a result, there may be errors in the script that have gone undetected. Please consider this when interpreting information found in this chart.    Again, thank you for allowing me to participate in the care of your patient.        Sincerely,        Duran Fong DO    Electronically signed

## 2025-07-28 ASSESSMENT — ACTIVITIES OF DAILY LIVING (ADL)
WASHING_YOUR_HAIR?: 0
TOUCHING_THE_BACK_OF_YOUR_NECK: 0
PUSHING_WITH_THE_INVOLVED_ARM: 4
PLEASE_INDICATE_YOR_PRIMARY_REASON_FOR_REFERRAL_TO_THERAPY:: SHOULDER
CARRYING_A_HEAVY_OBJECT_OF_10_POUNDS: 2
PUTTING_ON_YOUR_PANTS: 2
PUTTING_ON_A_SHIRT_THAT_BUTTONS_DOWN_THE_FRONT: 0
REACHING_FOR_SOMETHING_ON_A_HIGH_SHELF: 1
AT_ITS_WORST?: 5
WHEN_LYING_ON_THE_INVOLVED_SIDE: 5
PUTTING_ON_AN_UNDERSHIRT_OR_A_PULLOVER_SWEATER: 0
PLACING_AN_OBJECT_ON_A_HIGH_SHELF: 0
WASHING_YOUR_BACK: 0
REMOVING_SOMETHING_FROM_YOUR_BACK_POCKET: 4

## 2025-07-29 ENCOUNTER — THERAPY VISIT (OUTPATIENT)
Dept: PHYSICAL THERAPY | Facility: CLINIC | Age: 45
End: 2025-07-29
Attending: FAMILY MEDICINE
Payer: COMMERCIAL

## 2025-07-29 DIAGNOSIS — M25.511 ACUTE PAIN OF RIGHT SHOULDER: ICD-10-CM

## 2025-07-29 DIAGNOSIS — M89.9 SCAPULAR DYSFUNCTION: ICD-10-CM

## 2025-07-29 DIAGNOSIS — M67.911 TENDINOPATHY OF RIGHT ROTATOR CUFF: Primary | ICD-10-CM

## 2025-07-29 PROCEDURE — 97161 PT EVAL LOW COMPLEX 20 MIN: CPT | Mod: GP

## 2025-07-29 PROCEDURE — 97110 THERAPEUTIC EXERCISES: CPT | Mod: GP

## 2025-07-29 NOTE — PROGRESS NOTES
PHYSICAL THERAPY EVALUATION  Type of Visit: Evaluation     Fall Risk Screen:  Have you fallen 2 or more times in the past year?: No  Have you fallen and had an injury in the past year?: No    Subjective         Presenting condition or subjective complaint: ongoing shoulder pain    Pt referred by Dr. Fong for ongoing R shoulder pain. Worsened this last spring after bowling tournament. Lots of soreness. Will not be bowling till Sept of this year.  Noticing a popping sensation every time he moves it, specifically overhead. 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 Shoulder is aggravated while trying to  kids and lifting heavy objects, especially with arms extended outward. Wakes up fairly sore if sleeping on his R side - the side he typically likes to sleep on. Has not done PT for issue in the past. Right hand dominant.    Date of onset: 07/29/23    Relevant medical history: Asthma; High blood pressure; Vision problems   No past medical history on file.  Patient Active Problem List   Diagnosis    Anxiety    Exercise-induced asthma    Irritable bowel syndrome    Lactase deficiency    Mixed hyperlipidemia    Pulmonary nodules    Right inguinal hernia    Vitamin D deficiency    Tendinopathy of right rotator cuff    Acute pain of right shoulder    Scapular dysfunction     Dates & types of surgery: Eye enucleation  No past surgical history on file.    Prior diagnostic imaging/testing results: MRI     MRI 7/16: 1. Low-grade intrasubstance tear of the far anterior fibers of the  supraspinatus tendon and mild bursal sided fraying superimposed on  moderate grade tendinopathy. No significant tendon retraction.   2. Mild to moderate tendinopathy of the subscapularis tendon.   3. Preserved muscle bulk.    Prior therapy history for the same diagnosis, illness or injury: No      Prior Level of Function  Transfers: Independent  Ambulation: Independent  ADL:  Independent  IADL: Childcare, Driving, Finances, Housekeeping, Laundry, Meal preparation, Work, Yard work    Living Environment  Social support: With a significant other or spouse   Type of home: House   Stairs to enter the home: No       Ramp: No   Stairs inside the home: Yes 20 Is there a railing: Yes   Help at home: None  Equipment owned:     Employment: Yes Sales/  Hobbies/Interests: Playto, [employer], [employer], [employer]    Patient goals for therapy: throw a baseball, bowling     Objective   SHOULDER EVALUATION  PAIN: Pain Level at Rest: 0/10  Pain Level with Use: 5/10  Pain Location: shoulder  Pain Quality: Tender and Throbbing  Pain Frequency: intermittent  Pain is Worst: with use  Pain is Exacerbated By: overhead activity, lifting, throwing a ball, bowling  Pain is Relieved By: has not used anything yet to help  Pain Progression: Improved    INTEGUMENTARY (edema, incisions): WFL    POSTURE: Forward head, rounded shoulders, increased kyphosis  GAIT:   Weightbearing Status: WBAT  Assistive Device(s): None  Gait Deviations: WFL    ROM:   (Degrees) Left AROM Left PROM Right AROM  Right PROM   Shoulder Flexion 152  132 pain 142   Shoulder Extension       Shoulder Abduction 158  145 pain 145 stiff and pain   Shoulder Adduction       Shoulder Internal Rotation T9  T11 pain About 50   Shoulder External Rotation T4  T3 pain About 70       STRENGTH:   Strength Scale: 0-5/5 Left Right   Shoulder Flexion 5 4- pain   Shoulder Abduction 5 4- pain   Shoulder Internal Rotation 5 5   Shoulder External Rotation 5 4 pain   Elbow Flexion 5 5   Elbow Extension 5 5     FLEXIBILITY: Decreased UT and levator    SPECIAL TESTS:    Left Right   Impingement     Neer's Negative  Positive   Hawkin's-Bryn Negative  Positive   Coracoid Impingement Negative  Negative    Internal impingement Negative  Negative    Labral     Charleston's Negative  Negative    Multi-Directional Instability      Sulcus Negative   Negative    Biceps      Speed's Negative  Negative    Rotator Cuff Tear     Full Can Negative  Positive   90 / 90 ER Negative  Positive   Lift off  Negative  Positive for pain     PALPATION: TTP near supraspinatus, infraspinatus insert, subacromial space     JOINT MOBILITY: hypomobile with posterior and inferior glide    CERVICAL SCREEN: limited ext and SB B      Assessment & Plan   CLINICAL IMPRESSIONS  Medical Diagnosis: Tendinopathy of right rotator cuff  Acute pain of right shoulder  Scapular dysfunction    Treatment Diagnosis: R shoulder pain   Impression/Assessment: Patient is a 44 year old male with R shoulder pain complaints.  The following significant findings have been identified: Pain, Decreased ROM/flexibility, Decreased joint mobility, Decreased strength, Inflammation, Edema, Impaired muscle performance, Decreased activity tolerance, and Impaired posture. These impairments interfere with their ability to perform self care tasks, work tasks, recreational activities, household chores, and driving  as compared to previous level of function.     Clinical Decision Making (Complexity):  Clinical Presentation: Stable/Uncomplicated  Clinical Presentation Rationale: based on medical and personal factors listed in PT evaluation  Clinical Decision Making (Complexity): Low complexity    PLAN OF CARE  Treatment Interventions:  Modalities: Cryotherapy, Cupping, Dry Needling, E-stim, Hot Pack, Ultrasound  Interventions: Manual Therapy, Neuromuscular Re-education, Therapeutic Activity, Therapeutic Exercise, Self-Care/Home Management    Long Term Goals     PT Goal 1  Goal Identifier: 1  Goal Description: Pt will have symmetrical ROM to to L UE in order to dress more easily  Target Date: 08/26/25  PT Goal 2  Goal Identifier: 2  Goal Description: Pt will have at least 4+/5 R shoulder strength in order to lift more easily  Target Date: 09/23/25  PT Goal 3  Goal Identifier: 3  Goal Description: Pt will be able to reach  overhead into cupboard without rene  Target Date: 09/23/25  PT Goal 4  Goal Identifier: 4  Goal Description: Pt will be able to lift up kids without increase in sxs to care for them  Target Date: 09/23/25      Frequency of Treatment: 1x/week  Duration of Treatment: 8 visits    Recommended Referrals to Other Professionals: none at this time    Education Assessment:   Learner/Method: Patient;Listening;Reading;Demonstration;Pictures/Video    Risks and benefits of evaluation/treatment have been explained.   Patient/Family/caregiver agrees with Plan of Care.     Evaluation Time:     PT Eval, Low Complexity Minutes (49090): 15     Signing Clinician: Lucy Mendez PT